# Patient Record
Sex: FEMALE | Race: WHITE | NOT HISPANIC OR LATINO
[De-identification: names, ages, dates, MRNs, and addresses within clinical notes are randomized per-mention and may not be internally consistent; named-entity substitution may affect disease eponyms.]

---

## 2021-03-10 ENCOUNTER — RESULT REVIEW (OUTPATIENT)
Age: 26
End: 2021-03-10

## 2021-06-16 ENCOUNTER — NON-APPOINTMENT (OUTPATIENT)
Age: 26
End: 2021-06-16

## 2021-06-16 PROBLEM — Z00.00 ENCOUNTER FOR PREVENTIVE HEALTH EXAMINATION: Status: ACTIVE | Noted: 2021-06-16

## 2021-06-17 ENCOUNTER — APPOINTMENT (OUTPATIENT)
Dept: OTOLARYNGOLOGY | Facility: CLINIC | Age: 26
End: 2021-06-17
Payer: COMMERCIAL

## 2021-06-17 VITALS — HEIGHT: 63 IN | BODY MASS INDEX: 25.69 KG/M2 | WEIGHT: 145 LBS | TEMPERATURE: 97.8 F

## 2021-06-17 DIAGNOSIS — Z83.49 FAMILY HISTORY OF OTHER ENDOCRINE, NUTRITIONAL AND METABOLIC DISEASES: ICD-10-CM

## 2021-06-17 DIAGNOSIS — Z84.1 FAMILY HISTORY OF DISORDERS OF KIDNEY AND URETER: ICD-10-CM

## 2021-06-17 DIAGNOSIS — H61.22 IMPACTED CERUMEN, LEFT EAR: ICD-10-CM

## 2021-06-17 DIAGNOSIS — Z86.59 PERSONAL HISTORY OF OTHER MENTAL AND BEHAVIORAL DISORDERS: ICD-10-CM

## 2021-06-17 DIAGNOSIS — Z78.9 OTHER SPECIFIED HEALTH STATUS: ICD-10-CM

## 2021-06-17 PROCEDURE — 99214 OFFICE O/P EST MOD 30 MIN: CPT | Mod: 25

## 2021-06-17 PROCEDURE — 31575 DIAGNOSTIC LARYNGOSCOPY: CPT

## 2021-06-17 PROCEDURE — 69210 REMOVE IMPACTED EAR WAX UNI: CPT

## 2021-06-17 PROCEDURE — 99072 ADDL SUPL MATRL&STAF TM PHE: CPT

## 2021-06-17 RX ORDER — ESCITALOPRAM OXALATE 5 MG/1
TABLET, FILM COATED ORAL
Refills: 0 | Status: ACTIVE | COMMUNITY

## 2021-06-17 RX ORDER — NORETHINDRONE ACETATE AND ETHINYL ESTRADIOL AND FERROUS FUMARATE 1.5-30(21)
KIT ORAL
Refills: 0 | Status: ACTIVE | COMMUNITY

## 2021-06-17 NOTE — CONSULT LETTER
[Dear  ___] : Dear  [unfilled], [Consult Letter:] : I had the pleasure of evaluating your patient, [unfilled]. [Please see my note below.] : Please see my note below. [Sincerely,] : Sincerely, [FreeTextEntry3] : Rich Ambriz MD\par

## 2021-06-17 NOTE — ASSESSMENT
[FreeTextEntry1] : REGINE BUITRAGO is having exacerbation of LPR. I suggested and discussed in detail a strict reflux diet and gave the patient a handout.\par I am recommending Prilosec 20 mg 30-40 minutes before breakfast on an empty stomach.\par I am recommending Pepcid 20mg  before bedtime.\par She will call or come in in 2-3 weeks.\par \par

## 2021-08-17 ENCOUNTER — APPOINTMENT (OUTPATIENT)
Dept: OTOLARYNGOLOGY | Facility: CLINIC | Age: 26
End: 2021-08-17
Payer: COMMERCIAL

## 2021-08-17 VITALS — TEMPERATURE: 96.4 F | HEIGHT: 63 IN | BODY MASS INDEX: 25.34 KG/M2 | WEIGHT: 143 LBS

## 2021-08-17 PROCEDURE — 31575 DIAGNOSTIC LARYNGOSCOPY: CPT

## 2021-08-17 NOTE — ASSESSMENT
[FreeTextEntry1] : REGINE BUITRAGO feels about 90% improved. She will buy Reflux Gourmet or Gaviscon Advance ([place] and taper off of prilosec. RTC 4 weeks

## 2021-08-17 NOTE — HISTORY OF PRESENT ILLNESS
[de-identified] : REGINE BUITRAGO is a 25 year woman with a history of LPR who has been on prilosec, pepcid and reflux diet and is feeling much better.

## 2021-09-24 ENCOUNTER — APPOINTMENT (OUTPATIENT)
Dept: OTOLARYNGOLOGY | Facility: CLINIC | Age: 26
End: 2021-09-24
Payer: COMMERCIAL

## 2021-09-24 VITALS — TEMPERATURE: 97.3 F | BODY MASS INDEX: 24.8 KG/M2 | WEIGHT: 140 LBS | HEIGHT: 63 IN

## 2021-09-24 PROCEDURE — 31575 DIAGNOSTIC LARYNGOSCOPY: CPT

## 2021-09-24 PROCEDURE — 99213 OFFICE O/P EST LOW 20 MIN: CPT | Mod: 25

## 2021-09-24 NOTE — REASON FOR VISIT
[Subsequent Evaluation] : a subsequent evaluation for [Gastroesophageal Reflux] : gastroesophageal reflux [FreeTextEntry2] : throat/LPR

## 2021-09-24 NOTE — ASSESSMENT
[FreeTextEntry1] : REGINE BUITRAGO is doing very well. She will taper off of Prilosec. I will see back in one month.

## 2021-09-24 NOTE — HISTORY OF PRESENT ILLNESS
[de-identified] : REGINE BUITRAGO is a 25 year woman with a history of LPR with burning etc starting about 6 months ago. She is on strict reflux diet, Pepcid 20 hs and now Prilosec OTC every 3 morning and she uses Reflux Gourmet.She is feeling well.

## 2021-12-23 ENCOUNTER — APPOINTMENT (OUTPATIENT)
Dept: OTOLARYNGOLOGY | Facility: CLINIC | Age: 26
End: 2021-12-23
Payer: COMMERCIAL

## 2021-12-23 VITALS — BODY MASS INDEX: 25.69 KG/M2 | WEIGHT: 145 LBS | HEIGHT: 63 IN

## 2021-12-23 DIAGNOSIS — H69.80 OTHER SPECIFIED DISORDERS OF EUSTACHIAN TUBE, UNSPECIFIED EAR: ICD-10-CM

## 2021-12-23 PROCEDURE — 99213 OFFICE O/P EST LOW 20 MIN: CPT | Mod: 25

## 2021-12-23 PROCEDURE — 31575 DIAGNOSTIC LARYNGOSCOPY: CPT

## 2021-12-23 RX ORDER — OMEPRAZOLE 40 MG/1
40 CAPSULE, DELAYED RELEASE ORAL
Refills: 0 | Status: DISCONTINUED | COMMUNITY
End: 2021-12-23

## 2021-12-23 RX ORDER — FAMOTIDINE 40 MG/1
TABLET, FILM COATED ORAL
Refills: 0 | Status: DISCONTINUED | COMMUNITY
End: 2021-12-23

## 2021-12-23 NOTE — HISTORY OF PRESENT ILLNESS
[de-identified] : REGINE BUITRAGO is a 26 year woman with a history of severe LPR and some heartburn about 7 months ago. With diet and meds she is feeling very well. She is off meds now ans watching her diet. Her ears are no longer clogged. She ahd Covid several weeks ago.

## 2022-06-17 ENCOUNTER — APPOINTMENT (OUTPATIENT)
Dept: OTOLARYNGOLOGY | Facility: CLINIC | Age: 27
End: 2022-06-17
Payer: COMMERCIAL

## 2022-06-17 VITALS — HEIGHT: 63 IN | BODY MASS INDEX: 25.69 KG/M2 | TEMPERATURE: 96.6 F | WEIGHT: 145 LBS

## 2022-06-17 DIAGNOSIS — H69.83 OTHER SPECIFIED DISORDERS OF EUSTACHIAN TUBE, BILATERAL: ICD-10-CM

## 2022-06-17 DIAGNOSIS — K21.9 GASTRO-ESOPHAGEAL REFLUX DISEASE W/OUT ESOPHAGITIS: ICD-10-CM

## 2022-06-17 PROCEDURE — 31575 DIAGNOSTIC LARYNGOSCOPY: CPT

## 2022-06-17 PROCEDURE — 99213 OFFICE O/P EST LOW 20 MIN: CPT | Mod: 25

## 2022-06-17 NOTE — HISTORY OF PRESENT ILLNESS
[de-identified] : REGINE BUITRAGO is a 26 year woman with a history of LPR. It has been intermittent and diet related. She has cut out coffee. She is having some allergy symptoms. She si having minor E-T issues.

## 2022-06-17 NOTE — ASSESSMENT
[FreeTextEntry1] : REGINE BUITRAGO is feeling reasonably well. I suggest using Pepcid 20mg for about a week and try antihistamine. I suggested she have upper GI endoscopy to rule out eosinophilic  esophagitis or  Barrets.

## 2023-04-11 NOTE — HISTORY OF PRESENT ILLNESS
[de-identified] : REGINE BUITRAGO is a 25 year woman with a history of several weeks of LPR with burning in her throat. She has been on Omeprazole 40mg. She has history of TMJ problems and E-t dysfunction and has had myringotomies. Her ear stuffiness has been better since she is on Omeprazole. Scribe Attestation (For Scribes USE Only)... Attending Attestation (For Attendings USE Only).../Scribe Attestation (For Scribes USE Only)...

## 2024-09-03 ENCOUNTER — APPOINTMENT (OUTPATIENT)
Dept: OTOLARYNGOLOGY | Facility: CLINIC | Age: 29
End: 2024-09-03
Payer: COMMERCIAL

## 2024-09-03 VITALS — WEIGHT: 135 LBS | BODY MASS INDEX: 23.92 KG/M2 | HEIGHT: 63 IN

## 2024-09-03 DIAGNOSIS — H69.90 UNSPECIFIED EUSTACHIAN TUBE DISORDER, UNSPECIFIED EAR: ICD-10-CM

## 2024-09-03 DIAGNOSIS — K21.9 GASTRO-ESOPHAGEAL REFLUX DISEASE W/OUT ESOPHAGITIS: ICD-10-CM

## 2024-09-03 PROCEDURE — 99213 OFFICE O/P EST LOW 20 MIN: CPT

## 2024-09-03 NOTE — REASON FOR VISIT
[Subsequent Evaluation] : a subsequent evaluation for [FreeTextEntry2] : eustachian tube dysfunction

## 2024-09-03 NOTE — HISTORY OF PRESENT ILLNESS
[de-identified] : REGINE BUITRAGO  is a 28 year woman with a history of cerumen impaction and allergic rhinitis and LPR. She has stuffy itchy ears and is having mild LPR.

## 2024-09-03 NOTE — ASSESSMENT
[FreeTextEntry1] : REGINE BUITRAGO is having ET dysfunction. We reviewed the reflux diet etc. and I gave her a handout. RTC prn.